# Patient Record
Sex: MALE | Race: WHITE | Employment: FULL TIME | URBAN - METROPOLITAN AREA
[De-identification: names, ages, dates, MRNs, and addresses within clinical notes are randomized per-mention and may not be internally consistent; named-entity substitution may affect disease eponyms.]

---

## 2020-03-16 ENCOUNTER — TELEPHONE (OUTPATIENT)
Dept: ENDOCRINOLOGY | Facility: CLINIC | Age: 55
End: 2020-03-16

## 2020-03-17 ENCOUNTER — ALLIED HEALTH/NURSE VISIT (OUTPATIENT)
Dept: SURGERY | Facility: CLINIC | Age: 55
End: 2020-03-17
Payer: OTHER GOVERNMENT

## 2020-03-17 ENCOUNTER — OFFICE VISIT (OUTPATIENT)
Dept: ENDOCRINOLOGY | Facility: CLINIC | Age: 55
End: 2020-03-17
Payer: OTHER GOVERNMENT

## 2020-03-17 VITALS
DIASTOLIC BLOOD PRESSURE: 89 MMHG | WEIGHT: 210.6 LBS | BODY MASS INDEX: 31.92 KG/M2 | SYSTOLIC BLOOD PRESSURE: 141 MMHG | HEART RATE: 66 BPM | HEIGHT: 68 IN | TEMPERATURE: 98.4 F | OXYGEN SATURATION: 96 %

## 2020-03-17 DIAGNOSIS — Z71.3 NUTRITIONAL COUNSELING: Primary | ICD-10-CM

## 2020-03-17 DIAGNOSIS — E66.09 CLASS 1 OBESITY DUE TO EXCESS CALORIES WITH SERIOUS COMORBIDITY AND BODY MASS INDEX (BMI) OF 32.0 TO 32.9 IN ADULT: ICD-10-CM

## 2020-03-17 DIAGNOSIS — E66.811 CLASS 1 OBESITY DUE TO EXCESS CALORIES WITH SERIOUS COMORBIDITY AND BODY MASS INDEX (BMI) OF 32.0 TO 32.9 IN ADULT: ICD-10-CM

## 2020-03-17 RX ORDER — AMLODIPINE BESYLATE 10 MG/1
TABLET ORAL
COMMUNITY
Start: 2019-11-22

## 2020-03-17 RX ORDER — TOPIRAMATE 25 MG/1
TABLET, FILM COATED ORAL
Qty: 270 TABLET | Refills: 1 | Status: SHIPPED | OUTPATIENT
Start: 2020-03-17 | End: 2020-04-16

## 2020-03-17 RX ORDER — PRAZOSIN HYDROCHLORIDE 1 MG/1
CAPSULE ORAL
COMMUNITY
Start: 2020-01-03

## 2020-03-17 ASSESSMENT — MIFFLIN-ST. JEOR: SCORE: 1779.78

## 2020-03-17 ASSESSMENT — PAIN SCALES - GENERAL: PAINLEVEL: NO PAIN (0)

## 2020-03-17 NOTE — PROGRESS NOTES
"    New Medical Weight Management Consult    PATIENT:  Reece Matos  MRN:         [id number]  :         1965  SOMMER:         3/17/2020    Dear primary care physician,    I had the pleasure of seeing your patient, Reece Matos. Full intake/assessment was done to determine barriers to weight loss success and develop a treatment plan. Reece Matos is a 54 year old male interested in treatment of medical problems associated with excess weight. He has a height of 5' 8\", a weight of 210 lbs 9.6 oz, and the calculated Body mass index is 32.02 kg/m .    ASSESSMENT/PLAN:  Reece is a patient with mature onset obesity without significant element of familial/genetic influence and with current health consequences. He does not need aggressive weight loss plan.  Reece Matos eats a high carb diet, eats a high fat diet, has perception of intense hunger, eats to obtain specific degree of fullness, mostly eats during the evening and has a disorganized meal pattern.    His problem is complicated by strong craving/reward pathways and poor lifestyle choices    His ability to lose weight is impacted by lack of confidence.    PLAN:    Eat breakfast daily  Decrease portion sizes  Decrease eating out  Purge house of food triggers  No meal skipping  Dietician visit of education  Calorie/low fat diet  Meal planning  Increase activity as tolerated    Craving/Reward   Ancillary testing:  N/A.  Food Plan:  High protein/low carbohydrate.   Activity Plan:  Exercise after meals.  Supplementary:  N/A.   Medication:  The patient will begin medication in pursuit of improved medical status as influenced by body weight. He will start topiramate titration from 25 mg daily up to 75 mg daily.  There is a mutual understanding of the goals and risks of this therapy. The patient is in agreement. He is educated on dosage regimen and possible side effects.      Orders Placed This Encounter   Procedures     MED THERAPY MANAGE REFERRAL " "      He has the following co-morbidities:hypertension, [place], knee pain, depression and fatigue       3/13/2020   I have the following health issues associated with obesity: High Blood Pressure, Sleep Apnea   I have the following symptoms associated with obesity: Knee Pain, Depression, Fatigue     He said that he gradually gained weight after he came back from [facility] in 2013. 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 He gradually gained up to 235 lbs in the past 8-10 years. He said that it is from dietary habit. He has not been eating well and has not exercised as he used to. He is now struggling with hypertension and [place] and would like to work on weight loss to reduce consequence of obesity.     He tried to dieting by fasting between 9 pm to 11 am and lost about 25 lbs. However, weight plateaued out. He reports eating most of his calories in the evening and larger portion. He feels strong urge and has food thought a lot in the evening. He does not eat much during the day. 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     Diet recalled:  BF: only coffee  Lunch: dry cereal or chip  Dinner: eat most calories at dinner + snacking on chip, ice-cream and sweet    Exercise: walking    Patient Goals 3/13/2020   I am interested in having a healthier weight to diminish current health problems: Yes   I am interested in having a healthier weight in order to prevent future health problems: Yes   I am interested in having a healthier weight in order to have a future surgery: No       Referring Provider 3/13/2020   Please name the provider who referred you to Medical Weight Management.  If you do not know, please answer: \"I Don't Know\". I don't know       Weight History 3/13/2020   How concerned are you about your weight? Very Concerned   Would you describe your weight gain as gradual? Yes   I became overweight: As an Adult   The following factors " have contributed to my weight gain:  Mental Health Issues, Eating Wrong Types of Food, Eating Too Much, Lack of Exercise, Stress, Other   Please list the other factors.  post deployment   I have tried the following methods to lose weight: Watching Portions or Calories, Exercise, Fasting   My lowest weight since age 20 was: 145   My highest weight since age 20 was: 235   The most weight I have ever lost was: (lbs) 20   I have the following family history of obesity/being overweight:  My mother is overweight, One or more of my siblings are overweight   Has anyone in your family had weight loss surgery? No   How has your weight changed over the last year?  Lost   How many pounds? 5       Diet Recall Review with Patient 3/13/2020   Do you typically eat breakfast? No   Do you typically eat lunch? No   Do you typically eat supper? Yes   If you do eat supper, what types of food do you typically eat? meats/starches   Do you typically eat snacks? Yes   If you do snack, what types of food do you typically eat? chips/dry cereal/grenola bars   Do you like vegetables?  No   Do you drink water? Yes   How many glasses of juice do you drink in a typical day? 0   How many of glasses of milk do you drink in a typical day? 0   If you do drink milk, what type? N/A   How many 8oz glasses of sugar containing drinks such as Ortiz-Aid/sweet tea do you drink in a day? 0   How many cans/bottles of sugar pop/soda/tea/sports drinks do you drink in a day? 0   How many cans/bottles of diet pop/soda/tea or sports drink do you drink in a day? 2   How often do you have a drink of alcohol? Monthly or Less   If you do drink, how many drinks might you have in a day? 1 or 2       Eating Habits 3/13/2020   Generally, my meals include foods like these: bread, pasta, rice, potatoes, corn, crackers, sweet dessert, pop, or juice. A Few Times a Week   Generally, my meals include foods like these: fried meats, brats, burgers, french fries, pizza, cheese,  chips, or ice cream. Once a Week   Eat fast food (like [employer]onal[employer], [employer], Taco Bell). Less Than Weekly   Eat at a buffet or sit-down restaurant. Less Than Weekly   Eat most of my meals in front of the TV or computer. Less Than Weekly   Often skip meals, eat at random times, have no regular eating times. Almost Everyday   Rarely sit down for a meal but snack or graze throughout.  Never   Eat extra snacks between meals. A Few Times a Week   Eat most of my food at the end of the day. Almost Everyday   Eat in the middle of the night or wake up at night to eat. Never   Eat extra snacks to prevent or correct low blood sugar. Never   Eat to prevent acid reflux or stomach pain. Never   Worry about not having enough food to eat. Never   Have you been to the food shelf at least a few times this year? No   I eat when I am depressed. A Few Times a Week   I eat when I am stressed. Less Than Weekly   I eat when I am bored. A Few Times a Week   I eat when I am anxious. Once a Week   I eat when I am happy or as a reward. Less Than Weekly   I feel hungry all the time even if I just have eaten. Never   Feeling full is important to me. Never   I finish all the food on my plate even if I am already full. A Few Times a Week   I can't resist eating delicious food or walk past the good food/smell. Less Than Weekly   I eat/snack without noticing that I am eating. A Few Times a Week   I eat when I am preparing the meal. Less Than Weekly   I eat more than usual when I see others eating. Never   I have trouble not eating sweets, ice cream, cookies, or chips if they are around the house. Less Than Weekly   I think about food all day. Less Than Weekly   What foods, if any, do you crave? None   Please list any other foods you crave? breads       Amount of Food 3/13/2020   I make myself vomit what I have eaten or use laxatives to get rid of food. Never   I eat a large amount of food, like a loaf of bread, a box of cookies, a pint/quart of  ice cream, all at once. Monthly   I eat a large amount of food even when I am not hungry. Weekly   I eat rapidly. Almost Everyday   I eat alone because I feel embarrassed and do not want others to see how much I have eaten. Weekly   I eat until I am uncomfortably full. Monthly   I feel bad, disgusted, or guilty after I overeat. Weekly   I make myself vomit what I have eaten or use laxatives to get rid of food. Never       Activity/Exercise History 3/13/2020   How much of a typical 12 hour day do you spend sitting? Most of the Day   How much of a typical 12 hour day do you spend lying down? Less Than Half the Day   How much of a typical day do you spend walking/standing? Less Than Half the Day   How many hours (not including work) do you spend on the TV/Video Games/Computer/Tablet/Phone? 2-3 Hours   How many times a week are you active for the purpose of exercise? 2-3 Times a Week   What keeps you from being more active? Too tired   How many total minutes do you spend doing some activity for the purpose of exercising when you exercise? More Than 30 Minutes       PAST MEDICAL HISTORY:  No past medical history on file.    Work/Social History Reviewed With Patient 3/13/2020   My employment status is: Full-Time   My job is:    How much of your job is spent on the computer or phone? 100%   How many hours do you spend commuting to work daily?  1   What is your marital status? /In a Relationship   If in a relationship, is your significant other overweight? Yes   Do you have children? Yes   If you have children, are they overweight? No   Who do you live with?  wife and 2 children   Are they supportive of your health goals? Yes   Who does the food shopping?  wife       Mental Health History Reviewed With Patient 3/13/2020   Have you ever been physically or sexually abused? No   If yes, do you feel that the abuse is affecting your weight? N/A   If yes, would you like to talk to a counselor about  "the abuse? N/A   How often in the past 2 weeks have you felt little interest or pleasure in doing things? For Several Days   Over the past 2 weeks how often have you felt down, depressed, or hopeless? For Several Days       Sleep History Reviewed With Patient 3/13/2020   How many hours do you sleep at night? 4.5   Do you think that you snore loudly or has anybody ever heard you snore loudly (louder than talking or so loud it can be heard behind a shut door)? Yes   Has anyone seen or heard you stop breathing during your sleep? Yes   Do you often feel tired, fatigued, or sleepy during the day? Yes   Do you have a TV/Computer in your bedroom? No       MEDICATIONS:   Current Outpatient Medications   Medication Sig Dispense Refill     amLODIPine (NORVASC) 10 MG tablet        FLUoxetine (PROZAC) 20 MG capsule        prazosin (MINIPRESS) 1 MG capsule          ALLERGIES:   Allergies no known allergies    PHYSICAL EXAM:  BP (!) 141/89 (BP Location: Left arm, Patient Position: Sitting, Cuff Size: Adult Large)   Pulse 66   Temp 98.4  F (36.9  C) (Oral)   Ht 1.727 m (5' 8\")   Wt 95.5 kg (210 lb 9.6 oz)   SpO2 96%   BMI 32.02 kg/m      Waist circumference: 109 cm    Wt Readings from Last 4 Encounters:   03/17/20 95.5 kg (210 lb 9.6 oz)     A & O x 3  HEENT: NCAT, mucous membranes moist  Respirations unlabored  Location of obesity: Mixed Obesity    FOLLOW-UP:   Follow up with Ross StricklandD in 4 weeks  See me in 8 weeks    TIME: 45 min spent on evaluation, management, counseling, education, & motivational interviewing with greater than 50 % of the total time was spent on counseling and coordinating care    Sincerely,    Cornelius Pacheco MD      "

## 2020-03-17 NOTE — LETTER
"3/17/2020       RE: Reece Matos  [id number] [id number]     Dear Colleague,    Thank you for referring your patient, Reece Matos, to the [facility] MEDICAL WEIGHT MANAGEMENT at [facility]. Please see a copy of my visit note below.        New Medical Weight Management Consult    PATIENT:  Reece Matos  MRN:         [id number]  :         1965  SOMMER:         3/17/2020    Dear primary care physician,    I had the pleasure of seeing your patient, Reece Matos. Full intake/assessment was done to determine barriers to weight loss success and develop a treatment plan. Reece Matos is a 54 year old male interested in treatment of medical problems associated with excess weight. He has a height of 5' 8\", a weight of 210 lbs 9.6 oz, and the calculated Body mass index is 32.02 kg/m .    ASSESSMENT/PLAN:  Reece is a patient with mature onset obesity without significant element of familial/genetic influence and with current health consequences. He does not need aggressive weight loss plan.  Reece Matos eats a high carb diet, eats a high fat diet, has perception of intense hunger, eats to obtain specific degree of fullness, mostly eats during the evening and has a disorganized meal pattern.    His problem is complicated by strong craving/reward pathways and poor lifestyle choices    His ability to lose weight is impacted by lack of confidence.    PLAN:    Eat breakfast daily  Decrease portion sizes  Decrease eating out  Purge house of food triggers  No meal skipping  Dietician visit of education  Calorie/low fat diet  Meal planning  Increase activity as tolerated    Craving/Reward   Ancillary testing:  N/A.  Food Plan:  High protein/low carbohydrate.   Activity Plan:  Exercise after meals.  Supplementary:  N/A.   Medication:  The patient will begin medication in pursuit of improved medical status as influenced by body weight. He will start " topiramate titration from 25 mg daily up to 75 mg daily.  There is a mutual understanding of the goals and risks of this therapy. The patient is in agreement. He is educated on dosage regimen and possible side effects.      Orders Placed This Encounter   Procedures     MED THERAPY MANAGE REFERRAL       He has the following co-morbidities:hypertension, [place], knee pain, depression and fatigue       3/13/2020   I have the following health issues associated with obesity: High Blood Pressure, Sleep Apnea   I have the following symptoms associated with obesity: Knee Pain, Depression, Fatigue     He said that he gradually gained weight after he came back from [facility] in 2013. 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 He gradually gained up to 235 lbs in the past 8-10 years. He said that it is from dietary habit. He has not been eating well and has not exercised as he used to. He is now struggling with hypertension and [place] and would like to work on weight loss to reduce consequence of obesity.     He tried to dieting by fasting between 9 pm to 11 am and lost about 25 lbs. However, weight plateaued out. He reports eating most of his calories in the evening and larger portion. He feels strong urge and has food thought a lot in the evening. He does not eat much during the day. 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     Diet recalled:  BF: only coffee  Lunch: dry cereal or chip  Dinner: eat most calories at dinner + snacking on chip, ice-cream and sweet    Exercise: walking    Patient Goals 3/13/2020   I am interested in having a healthier weight to diminish current health problems: Yes   I am interested in having a healthier weight in order to prevent future health problems: Yes   I am interested in having a healthier weight in order to have a future surgery: No       Referring Provider 3/13/2020   Please name the provider who referred you  "to Medical Weight Management.  If you do not know, please answer: \"I Don't Know\". I don't know       Weight History 3/13/2020   How concerned are you about your weight? Very Concerned   Would you describe your weight gain as gradual? Yes   I became overweight: As an Adult   The following factors have contributed to my weight gain:  Mental Health Issues, Eating Wrong Types of Food, Eating Too Much, Lack of Exercise, Stress, Other   Please list the other factors.  post deployment   I have tried the following methods to lose weight: Watching Portions or Calories, Exercise, Fasting   My lowest weight since age 20 was: 145   My highest weight since age 20 was: 235   The most weight I have ever lost was: (lbs) 20   I have the following family history of obesity/being overweight:  My mother is overweight, One or more of my siblings are overweight   Has anyone in your family had weight loss surgery? No   How has your weight changed over the last year?  Lost   How many pounds? 5       Diet Recall Review with Patient 3/13/2020   Do you typically eat breakfast? No   Do you typically eat lunch? No   Do you typically eat supper? Yes   If you do eat supper, what types of food do you typically eat? meats/starches   Do you typically eat snacks? Yes   If you do snack, what types of food do you typically eat? chips/dry cereal/grenola bars   Do you like vegetables?  No   Do you drink water? Yes   How many glasses of juice do you drink in a typical day? 0   How many of glasses of milk do you drink in a typical day? 0   If you do drink milk, what type? N/A   How many 8oz glasses of sugar containing drinks such as Ortiz-Aid/sweet tea do you drink in a day? 0   How many cans/bottles of sugar pop/soda/tea/sports drinks do you drink in a day? 0   How many cans/bottles of diet pop/soda/tea or sports drink do you drink in a day? 2   How often do you have a drink of alcohol? Monthly or Less   If you do drink, how many drinks might you have in a " day? 1 or 2       Eating Habits 3/13/2020   Generally, my meals include foods like these: bread, pasta, rice, potatoes, corn, crackers, sweet dessert, pop, or juice. A Few Times a Week   Generally, my meals include foods like these: fried meats, brats, burgers, french fries, pizza, cheese, chips, or ice cream. Once a Week   Eat fast food (like [employer], [employer], Taco Bell). Less Than Weekly   Eat at a buffet or sit-down restaurant. Less Than Weekly   Eat most of my meals in front of the TV or computer. Less Than Weekly   Often skip meals, eat at random times, have no regular eating times. Almost Everyday   Rarely sit down for a meal but snack or graze throughout.  Never   Eat extra snacks between meals. A Few Times a Week   Eat most of my food at the end of the day. Almost Everyday   Eat in the middle of the night or wake up at night to eat. Never   Eat extra snacks to prevent or correct low blood sugar. Never   Eat to prevent acid reflux or stomach pain. Never   Worry about not having enough food to eat. Never   Have you been to the food shelf at least a few times this year? No   I eat when I am depressed. A Few Times a Week   I eat when I am stressed. Less Than Weekly   I eat when I am bored. A Few Times a Week   I eat when I am anxious. Once a Week   I eat when I am happy or as a reward. Less Than Weekly   I feel hungry all the time even if I just have eaten. Never   Feeling full is important to me. Never   I finish all the food on my plate even if I am already full. A Few Times a Week   I can't resist eating delicious food or walk past the good food/smell. Less Than Weekly   I eat/snack without noticing that I am eating. A Few Times a Week   I eat when I am preparing the meal. Less Than Weekly   I eat more than usual when I see others eating. Never   I have trouble not eating sweets, ice cream, cookies, or chips if they are around the house. Less Than Weekly   I think about food all day. Less Than Weekly    What foods, if any, do you crave? None   Please list any other foods you crave? breads       Amount of Food 3/13/2020   I make myself vomit what I have eaten or use laxatives to get rid of food. Never   I eat a large amount of food, like a loaf of bread, a box of cookies, a pint/quart of ice cream, all at once. Monthly   I eat a large amount of food even when I am not hungry. Weekly   I eat rapidly. Almost Everyday   I eat alone because I feel embarrassed and do not want others to see how much I have eaten. Weekly   I eat until I am uncomfortably full. Monthly   I feel bad, disgusted, or guilty after I overeat. Weekly   I make myself vomit what I have eaten or use laxatives to get rid of food. Never       Activity/Exercise History 3/13/2020   How much of a typical 12 hour day do you spend sitting? Most of the Day   How much of a typical 12 hour day do you spend lying down? Less Than Half the Day   How much of a typical day do you spend walking/standing? Less Than Half the Day   How many hours (not including work) do you spend on the TV/Video Games/Computer/Tablet/Phone? 2-3 Hours   How many times a week are you active for the purpose of exercise? 2-3 Times a Week   What keeps you from being more active? Too tired   How many total minutes do you spend doing some activity for the purpose of exercising when you exercise? More Than 30 Minutes       PAST MEDICAL HISTORY:  No past medical history on file.    Work/Social History Reviewed With Patient 3/13/2020   My employment status is: Full-Time   My job is:    How much of your job is spent on the computer or phone? 100%   How many hours do you spend commuting to work daily?  1   What is your marital status? /In a Relationship   If in a relationship, is your significant other overweight? Yes   Do you have children? Yes   If you have children, are they overweight? No   Who do you live with?  wife and 2 children   Are they supportive of  "your health goals? Yes   Who does the food shopping?  wife       Mental Health History Reviewed With Patient 3/13/2020   Have you ever been physically or sexually abused? No   If yes, do you feel that the abuse is affecting your weight? N/A   If yes, would you like to talk to a counselor about the abuse? N/A   How often in the past 2 weeks have you felt little interest or pleasure in doing things? For Several Days   Over the past 2 weeks how often have you felt down, depressed, or hopeless? For Several Days       Sleep History Reviewed With Patient 3/13/2020   How many hours do you sleep at night? 4.5   Do you think that you snore loudly or has anybody ever heard you snore loudly (louder than talking or so loud it can be heard behind a shut door)? Yes   Has anyone seen or heard you stop breathing during your sleep? Yes   Do you often feel tired, fatigued, or sleepy during the day? Yes   Do you have a TV/Computer in your bedroom? No       MEDICATIONS:   Current Outpatient Medications   Medication Sig Dispense Refill     amLODIPine (NORVASC) 10 MG tablet        FLUoxetine (PROZAC) 20 MG capsule        prazosin (MINIPRESS) 1 MG capsule          ALLERGIES:   Allergies no known allergies    PHYSICAL EXAM:  BP (!) 141/89 (BP Location: Left arm, Patient Position: Sitting, Cuff Size: Adult Large)   Pulse 66   Temp 98.4  F (36.9  C) (Oral)   Ht 1.727 m (5' 8\")   Wt 95.5 kg (210 lb 9.6 oz)   SpO2 96%   BMI 32.02 kg/m      Waist circumference: 109 cm    Wt Readings from Last 4 Encounters:   03/17/20 95.5 kg (210 lb 9.6 oz)     A & O x 3  HEENT: NCAT, mucous membranes moist  Respirations unlabored  Location of obesity: Mixed Obesity    FOLLOW-UP:   Follow up with Nanda Aviles, RossD in 4 weeks  See me in 8 weeks    TIME: 45 min spent on evaluation, management, counseling, education, & motivational interviewing with greater than 50 % of the total time was spent on counseling and coordinating care    Sincerely,    Cornelius " MD Tara

## 2020-03-17 NOTE — NURSING NOTE
"(   Chief Complaint   Patient presents with     Consult     New appointment weight management.     )    ( Weight: 95.5 kg (210 lb 9.6 oz) )  ( Height: 172.7 cm (5' 8\") )  ( BMI (Calculated): 32.02 )  (   )  ( Cumulative weight loss (lbs): -0.6 )  (   )  (   )  ( Waist Circumference (cm): 109 cm )  (   )    ( BP: (!) 141/89 )  (   )  ( Temp: 98.4  F (36.9  C) )  ( Temp src: Oral )  ( Pulse: 66 )  (   )  ( SpO2: 96 % )    ( There is no problem list on file for this patient.   )  (   Current Outpatient Medications   Medication Sig Dispense Refill     amLODIPine (NORVASC) 10 MG tablet        FLUoxetine (PROZAC) 20 MG capsule        prazosin (MINIPRESS) 1 MG capsule       )  ( Diabetes Eval:    )    ( Pain Eval:  No Pain (0) )    ( Wound Eval:       )    (   History   Smoking Status     Never Smoker   Smokeless Tobacco     Never Used    )    ( Signed By:  Beverly Cantrell EMT; March 17, 2020; 10:43 AM )    "

## 2020-03-17 NOTE — PATIENT INSTRUCTIONS
"Please take a look at this website for resources and recipes [email or web address].[email or web address]    Try to stop eating before 8 pm    Eat protein or drink protein shake at 9 am    Start topiramate: Take 25 mg (one tab) at bedtime for one week, then increase to 50 mg (two tabs) at bedtime, then increase to 75 mg (3 tabs) thereafter.     Return 2 months    If you have any questions, please do not hesitate to call Weight management clinic at [phone] or [phone]    Sincerely,    Cornelius Pacheco MD  Endocrinology     Welcome to our weight management program!   We are excited to join you on your weight loss journey    Thank you for allowing us the privilege of caring for you. We hope we provided you with the excellent service you deserve.   Please let us know if there is anything else we can do for you so that we can be sure you are leaving completely satisfied with your care experience.    To ensure the quality of our services you may be receiving a patient satisfaction survey from an independent patient satisfaction monitoring company.    The greatest compliment you can give is a \"Likely to Recommend\"    You saw Dr. Cornelius SZYMANSKI today.    Instructions per today's visit:   Medications started today: Topiramate ramp to 75mg  MTM pharmacist referral: 1 month by phone    Follow up appointments:  Dietitian today and monthly as needed.  Lauren Bloch, MTM phrmacist by phone in 1 month.  Dr. Shields in 2 months.    Interested in working with a health ?  Health coaches work with you to improve your overall health and wellbeing.  They look at the whole person, and may involve discussion of different areas of life, including, but not limited to the four pillars of health (sleep, exercise, nutrition, and stress management). Discuss with your care team if you would like to start working a health .  Health Coaching-3 Pack:    $99 for three health coaching visits    Visits may be done in person or via phone    Coaching " is a partnership between the  and the client; Coaches do not prescribe or diagnose    Coaching helps inspire the client to reach his/her personal goals     For any questions/concerns contact Jacqueline Sanchez LPN at [phone]     To schedule appointments with our team, please call [phone]     Please call during clinic hours Monday through Friday 8:00a - 4:00p if you have questions or you can contact us via [email or web address] at anytime. ?    Lab results will be communicated through My Chart or letter (if My Chart not used). Please call the clinic if you have not received communication after 1 week or if you have any questions.?      Fax: [phone]    Thank you,  Medical Weight Management Team

## 2020-03-17 NOTE — PATIENT INSTRUCTIONS
"Nutrition Goals  1) 9\" Plate method (1/2 plate non-starchy vegetables/fruit, 1/4 plate lean protein, 1/4 plate whole grain starch - no more than 1/2 cup carb/meal)   - Try packing a lunch for work that meets plate method criteria   - Can also try a protein shake or bar as a meal replacement (see lists below)  2) Eat slowly (20-30 minutes per meal) and mindfully. Try not to eat until you're full, but only until you've satisfied your hunger.    - Set utensil down between bites of food   - Set a timer on your phone for 20-30 minutes   - Eat with non-dominant hand   - Sit up straight rather than a slouched posture   - Try to avoid eating in front of a screen (TV, computer, phone)   - Leave a few bites of food on your plate (rather than \"clean plate club\")    *Protein Shake Criteria: no more than 210 Calories, at least 20 grams of protein, and less than 10 grams of sugar     Meal Replacement Shake Options:   [facility] smoothie (160 Calories, 20 g protein)   Premier Protein (160 Calories, 30 g protein)  Slim Fast Advanced Nutrition (180 Calories, 20 g protein)  Muscle Milk, lactose-free, 17 oz bottle (210 Calories, 30 g protein)  Integrated Supplements, no artificial sugars (110 Calories, 20 g protein)  Genepro, unflavored protein powder (60 Calories, 30 g protein)    Meal Replacement Bar Options:  [facility] Protein Shake (160 Calories, 15 g protein)  Quest Protein Bars (190 Calories, 20 g protein)  Built Bar (170 Calories, 15-20 g protein)  One Protein Bar (210 calories, 20 g protein)  [place] Signature Protein Bar ([employer]) (190 Calories, 21 g protein)  Pure Protein Bars (180 Calories, 21 g protein)    Follow up with RD in one month    Alayna Jennings, MS, RD, LD  If you need to schedule or reschedule with a dietitian please call [phone].      Interested in working with a health ?  Health coaches work with you to improve your overall health and wellbeing.  They look at the whole person, and may involve " discussion of different areas of life, including, but not limited to the four pillars of health (sleep, exercise, nutrition, and stress management). Discuss with your care team if you would like to start working a health .     Health Coaching-3 Pack:      $99 for three health coaching visits    Visits may be done in person or via phone    Coaching is a partnership between the  and the client; Coaches do not prescribe or diagnose    Coaching helps inspire the client to reach his/her personal goals

## 2020-03-17 NOTE — PROGRESS NOTES
"New Weight Management Nutrition Consultation    Reece Matos is a 54 year old male presents today for new weight management nutrition consultation.  Patient referred by Dr. Shields on March 17, 2020.    Patient with Co-morbidities of obesity including:  Type II DM no  Renal Failure no  Sleep apnea no  Hypertension no - no mention in chart, but on 2 medications for high BP  Dyslipidemia no  Joint pain no  Back pain no  GERD no     Anthropometrics:  Estimated body mass index is 32.02 kg/m  as calculated from the following:    Height as of an earlier encounter on 3/17/20: 1.727 m (5' 8\").    Weight as of an earlier encounter on 3/17/20: 95.5 kg (210 lb 9.6 oz).    Medications for Weight Loss:  Topamax    NUTRITION HISTORY  See MD note for details.    Cravings include bread.  Large portions at night and snacking in afternoon/evening.  Only 4.5 hours of sleep at night per survey.  Lactose intolerant.    Recent food recall:  Breakfast: skip  Lunch: dry cereal or baked chips  Dinner: meat and starches (mac and cheese, green beans, chx nuggets)  Snacks: chips, dry cereal, granola bars, chips and cheese, ice cream, girl  cookies  Beverages: soda, coffee, water  Alcohol: monthly or less; 1-2 drinks per occasion (beer)  Dining out: less than weekly (once every couple weeks)    Physical Activity:  None at this time. 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 Currently sits at a desk most of the day for work. Has an elliptical and free weights at home and plans to start using these more.    MALNUTRITION  % Intake: No decreased intake noted  % Weight Loss: None noted  Subcutaneous Fat Loss: None observed  Muscle Loss: None observed  Fluid Accumulation/Edema: None noted  Malnutrition Diagnosis: Patient does not meet two of the established criteria necessary for diagnosing malnutrition    Nutrition Prescription  Recommended " "energy/nutrient modification.    Nutrition Diagnosis  Obesity r/t long history of self-monitoring deficit and excessive energy intake aeb BMI >30.    Nutrition Intervention  Materials/education provided on Volumetric eating to help satiety level on fewer calories; portion control and healthy food choices (Plate Method handout), sources of protein, and mindful eating discussed. Encouraged pt to move many of his calories to earlier in the day to avoid eating large portions and snacking at night. Discussed options for breakfast and lunch (protein shake, leftovers, options in the cafeteria). Also encouraged him to slow down when eating. Provided patient with list of goals and RD contact information.     Patient Understanding: good  Expected Compliance: good  Follow-Up Plans: smaller portions, slowing down when eating, avoid skipping meals     Nutrition Goals  1) 9\" Plate method (1/2 plate non-starchy vegetables/fruit, 1/4 plate lean protein, 1/4 plate whole grain starch - no more than 1/2 cup carb/meal)   - Try packing a lunch for work that meets plate method criteria   - Can also try a protein shake or bar as a meal replacement (see lists below)  2) Eat slowly (20-30 minutes per meal) and mindfully. Try not to eat until you're full, but only until you've satisfied your hunger.    - Set utensil down between bites of food   - Set a timer on your phone for 20-30 minutes   - Eat with non-dominant hand   - Sit up straight rather than a slouched posture   - Try to avoid eating in front of a screen (TV, computer, phone)   - Leave a few bites of food on your plate (rather than \"clean plate club\")    *Protein Shake Criteria: no more than 210 Calories, at least 20 grams of protein, and less than 10 grams of sugar     Meal Replacement Shake Options:    [employer] [facility] smoothie (160 Calories, 20 g protein)   Premier Protein (160 Calories, 30 g protein)  Slim Fast Advanced Nutrition (180 Calories, 20 g protein)  Muscle Milk, " lactose-free, 17 oz bottle (210 Calories, 30 g protein)  Integrated Supplements, no artificial sugars (110 Calories, 20 g protein)  Genepro, unflavored protein powder (60 Calories, 30 g protein)    Meal Replacement Bar Options:  [facility] Protein Shake (160 Calories, 15 g protein)  Quest Protein Bars (190 Calories, 20 g protein)  Built Bar (170 Calories, 15-20 g protein)  One Protein Bar (210 calories, 20 g protein)  [place] Signature Protein Bar ([employer]) (190 Calories, 21 g protein)  Pure Protein Bars (180 Calories, 21 g protein)    Follow-Up:  1 month    Time spent with patient: 30 minutes.  Alayna Jennings, MS, RD, LD

## 2020-04-16 ENCOUNTER — ALLIED HEALTH/NURSE VISIT (OUTPATIENT)
Dept: PHARMACY | Facility: CLINIC | Age: 55
End: 2020-04-16
Payer: OTHER GOVERNMENT

## 2020-04-16 PROCEDURE — 99207 ZZC NO CHARGE LOS: CPT | Performed by: PHARMACIST

## 2020-04-16 RX ORDER — TOPIRAMATE 25 MG/1
100 TABLET, FILM COATED ORAL DAILY
Qty: 120 TABLET | Refills: 1 | Status: SHIPPED | OUTPATIENT
Start: 2020-04-16 | End: 2020-09-01

## 2020-04-16 NOTE — PROGRESS NOTES
"Clinical Pharmacy Consult:                                                    Reece Matos is a 54 year old male called for a clinical pharmacist consult.  He was referred to me from Dr. Shields.     Reason for Consult: Topiramate check in     Obesity:   Topiramate 75 mg once daily at bedtime.     Followed by Dr. Cornelius Pacheco, last seen 3/17/2020 for New Medical Weight Management. Patient is experiencing the follow side effects: paraesthesia intermittently. No issues with sleep. No GI effects, intermittent bloating at the beginning but not now.   PMH:   Weight History: Reports that weight gain was gradual after he came back from [facility] in 2013. 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   Diet/Eating Habits: Patient reports that he loves carbs, breads.  Usually throughout the day because he is busy he doesn't typically have issues of cravings during the day. When he gets home is the issue. Has been eating more vegetables. Tries to stay away from corn. Eating more salad. Using a smaller plate.   Exercise/Activity: Patient reports that weather permitting he does walk outside 1 mile walk with dog a couple times per week. He is weight lifting 3 times weekly, 30 minutes each time. Busy at work with walking.      Wt Readings from Last 4 Encounters:   03/17/20 210 lb 9.6 oz (95.5 kg)     Estimated body mass index is 32.02 kg/m  as calculated from the following:    Height as of 3/17/20: 5' 8\" (1.727 m).    Weight as of 3/17/20: 210 lb 9.6 oz (95.5 kg).    Plan:  1. Can increase to topiramate 100 mg in evening when get home. CPA with Dr. Shields.  2. Follow up in 1 month.     Lauren Bloch, PharmD  Medication Therapy Management Pharmacist   [facility]   Phone: [phone]          "

## 2020-05-12 ENCOUNTER — ALLIED HEALTH/NURSE VISIT (OUTPATIENT)
Dept: PHARMACY | Facility: CLINIC | Age: 55
End: 2020-05-12
Payer: OTHER GOVERNMENT

## 2020-05-12 PROCEDURE — 99207 ZZC NO CHARGE LOS: CPT | Performed by: PHARMACIST

## 2020-05-12 NOTE — PROGRESS NOTES
"Clinical Pharmacy Consult:                                                    Reece Matos is a 54 year old male called for a clinical pharmacist consult.  He was referred to me from Dr. Shields.     Reason for Consult: Weight loss check in    Obesity:   Topiramate 100 mg once daily in evening.     Followed by Dr. Cornelius Pacheco, last seen 3/17/2020 for New Medical Weight Management. Patient is experiencing the follow side effects: none. Paraesthesia intermittent, not bothersome. Has lost 3 lb over the last month, cravings are tapering down. He is happy he is starting to see weight loss. He is not wanting to make additions/changes to regimen as of now.   Weight History: Reports that weight gain was gradual after he came back from [facility] in 2013. 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   Diet/Eating Habits: Has been using [employer] pal and tied into fit bit. Finds that appetite is lower. He is paying attention to carbs, fats and proteins in trish - using as an experiment and correcting when needed. Has been eating more vegetables. Using a smaller plate.   Exercise/Activity: Patient reports that weather permitting he does walk outside 1 mile walk with dog a couple times per week. He is weight lifting 3 times weekly, 30 minutes each time. Busy at work with walking.       Weight on home scale: 207 lb.  Wt Readings from Last 4 Encounters:   03/17/20 210 lb 9.6 oz (95.5 kg)     Estimated body mass index is 32.02 kg/m  as calculated from the following:    Height as of 3/17/20: 5' 8\" (1.727 m).    Weight as of 3/17/20: 210 lb 9.6 oz (95.5 kg).    Plan:  1. Continue current regimen for now.   2. Follow up with pharmacist in 1 month     Lauren Bloch, [place]  Medication Therapy Management Pharmacist   [facility]   Phone: [phone]      "

## 2020-05-12 NOTE — Clinical Note
FYI, he is starting to see some weight loss with increased topiramate dose. He didn't want to make further adjustments to meds today. He has made good progress with nutrition and exercise. He will f/up with me in 1 month and you in 2 months. Nanda EAST

## 2020-09-01 RX ORDER — TOPIRAMATE 25 MG/1
75 TABLET, FILM COATED ORAL DAILY
Qty: 270 TABLET | Refills: 0 | Status: SHIPPED | OUTPATIENT
Start: 2020-09-01 | End: 2020-09-08 | Stop reason: DRUGHIGH

## 2020-09-01 NOTE — TELEPHONE ENCOUNTER
TOPIRAMATE 25 MG TABLET   Last Written Prescription Date:  4/16/2020  Last Fill Quantity: 120,   # refills: 1  Last Office Visit : 3/17/2020  Future Office visit:  None    Routing refill request to provider for review/approval because:  Needing updated Labs on file:  CBC, ALT, AST, PLT       Also, continue same dose?   Change dose?   Follow up with Pt?      Refer to Provider for review.    Marie Loredo RN  Central Triage Red Flags/Med Refills

## 2020-09-02 ENCOUNTER — DOCUMENTATION ONLY (OUTPATIENT)
Dept: CARE COORDINATION | Facility: CLINIC | Age: 55
End: 2020-09-02

## 2020-09-08 ENCOUNTER — VIRTUAL VISIT (OUTPATIENT)
Dept: ENDOCRINOLOGY | Facility: CLINIC | Age: 55
End: 2020-09-08
Payer: OTHER GOVERNMENT

## 2020-09-08 VITALS — WEIGHT: 200 LBS | HEIGHT: 68 IN | BODY MASS INDEX: 30.31 KG/M2

## 2020-09-08 RX ORDER — TOPIRAMATE 50 MG/1
TABLET, FILM COATED ORAL
Qty: 180 TABLET | Refills: 2 | Status: SHIPPED | OUTPATIENT
Start: 2020-09-08

## 2020-09-08 ASSESSMENT — MIFFLIN-ST. JEOR: SCORE: 1726.69

## 2020-09-08 ASSESSMENT — PAIN SCALES - GENERAL: PAINLEVEL: NO PAIN (0)

## 2020-09-08 NOTE — PATIENT INSTRUCTIONS
- restart topiramate 50 mg daily for 2 weeks then increase to 100 mg daily thereafter    - return in 2 months    If you have any questions, please do not hesitate to call Weight management clinic at [phone] or [phone]    Sincerely,    Cornelius Pacheco MD  Endocrinology

## 2020-09-08 NOTE — PROGRESS NOTES
"Reece Matos is a 55 year old male who is being evaluated via a billable video visit.      The patient has been notified of following:     \"This video visit will be conducted via a call between you and your physician/provider. We have found that certain health care needs can be provided without the need for an in-person physical exam.  This service lets us provide the care you need with a video conversation.  If a prescription is necessary we can send it directly to your pharmacy.  If lab work is needed we can place an order for that and you can then stop by our lab to have the test done at a later time.    Video visits are billed at different rates depending on your insurance coverage.  Please reach out to your insurance provider with any questions.    If during the course of the call the physician/provider feels a video visit is not appropriate, you will not be charged for this service.\"    Patient has given verbal consent for Video visit? Yes  How would you like to obtain your AVS? MyChart  If you are dropped from the video visit, the video invite should be resent to: Text to cell phone: [phone]  Will anyone else be joining your video visit? No      During this virtual visit the patient is located in [place], patient verifies this as the location during the entirety of this visit.   Video-Visit Details    Type of service:  Video Visit    Video Start Time: 2:50 PM  Video End Time: 3:05 PM    Originating Location (pt. Location): Home    Distant Location (provider location):  [email or web address]     Platform used for Video Visit: ShipEarlyEcoSwarm        [facility] Medical Weight Management Note     Reece Matos  MRN:  [id number]  :  1965  SOMMER:  2020    Dear Benjamin E Van Vranken, MD,    I had the pleasure of seeing your patient Reece Matos. He is a 55 year old male who I am continuing to see for treatment of obesity related to: hypertension, [place], knee pain, depression and fatigue       " "3/13/2020   I have the following health issues associated with obesity: High Blood Pressure, Sleep Apnea   I have the following symptoms associated with obesity: Knee Pain, Depression, Fatigue     He said that he gradually gained weight after he came back from [facility] in 2013. 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 He gradually gained up to 235 lbs in the past 8-10 years. He said that it is from dietary habit. He has not been eating well and has not exercised as he used to.    INTERVAL HISTORY:  Last seen 3/17/2020 with me and with Lauren Bloch on 5/12/2020.    Started on topiramate March 2020 and increase topiramate to 100 mg daily since May. He said that it helped with cutting down soda and appetite. However, he feels that the medication did not work well since July 2020 so he stopped taking it after he ran out.  He lost 10 lbs in 6 months. He feels that he struggles with increased appetite after stopping topiramate.    He has continued to work on health lifestyle. He runs about 3 miles x 4-5 times per week. He is motivated for weight loss.    CURRENT WEIGHT:   200 lbs 0 oz    Initial Weight (lbs): 210 lbs  Last Visits Weight: 95.5 kg (210 lb 9.6 oz)  Cumulative weight loss (lbs): 10  Weight Loss Percentage: 4.76%    Changes and Difficulties 9/7/2020   I have made the following changes to my diet since my last visit: Eating smaller portions;healthier food   With regards to my diet, I am still struggling with: Carbs   I have made the following changes to my activity/exercise since my last visit: Running 4-5xs/week, 3.5 miles average each run       VITALS:  Ht 1.727 m (5' 8\")   Wt 90.7 kg (200 lb)   BMI 30.41 kg/m      MEDICATIONS:   Current Outpatient Medications   Medication Sig Dispense Refill     amLODIPine (NORVASC) 10 MG tablet        FLUoxetine (PROZAC) 20 MG capsule        prazosin (MINIPRESS) 1 MG capsule        topiramate (TOPAMAX) 25 MG tablet " Take 3 tablets (75 mg) by mouth daily 270 tablet 0       Weight Loss Medication History Reviewed With Patient 9/7/2020   Which weight loss medications are you currently taking on a regular basis?  Topamax (topiramate)   If you are not taking a weight loss medication that was prescribed to you, please indicate why: It did not seem to be helping me   Are you having any side effects from the weight loss medication that we have prescribed you? No       ASSESSMENT:   Reece Matos is a 55 year old male who I am continuing to see for treatment of obesity related to: hypertension, [place], knee pain, depression and fatigue    Weight is down 10 lbs in 6 months  topiramate helped cutting down appetite  Stopped after he ran out about 2 months ago  Appetite picked up slightly  Continues to work on diet and exercise regularly    PLAN:   - restart topriamate 50 mg daily for 2 weeks then increase to 100 mg daily  - continue to work on diet and exercise regularly    FOLLOW-UP:    2 months follow up.    Time: 15 min spent on evaluation, management, counseling, education, & motivational interviewing with greater than 50 % of the total time was spent on counseling and coordinating care    Sincerely,    Cornelius Pacheco MD

## 2020-09-08 NOTE — NURSING NOTE
"Chief Complaint   Patient presents with     RECHECK     Follow up appointment.       Vitals:    09/08/20 1418   Weight: 90.7 kg (200 lb)   Height: 1.727 m (5' 8\")       Body mass index is 30.41 kg/m .                            IOANA SALGADO, EMT    "

## 2020-09-08 NOTE — LETTER
"9/8/2020       RE: Reece Matos  [id number] [id number]     Dear Colleague,    Thank you for referring your patient, Reece Matos, to the [facility] at [facility]. Please see a copy of my visit note below.    Reece Matos is a 55 year old male who is being evaluated via a billable video visit.      The patient has been notified of following:     \"This video visit will be conducted via a call between you and your physician/provider. We have found that certain health care needs can be provided without the need for an in-person physical exam.  This service lets us provide the care you need with a video conversation.  If a prescription is necessary we can send it directly to your pharmacy.  If lab work is needed we can place an order for that and you can then stop by our lab to have the test done at a later time.    Video visits are billed at different rates depending on your insurance coverage.  Please reach out to your insurance provider with any questions.    If during the course of the call the physician/provider feels a video visit is not appropriate, you will not be charged for this service.\"    Patient has given verbal consent for Video visit? Yes  How would you like to obtain your AVS? MyChart  If you are dropped from the video visit, the video invite should be resent to: Text to cell phone: [phone]  Will anyone else be joining your video visit? No      During this virtual visit the patient is located in [place], patient verifies this as the location during the entirety of this visit.   Video-Visit Details    Type of service:  Video Visit    Video Start Time: 2:50 PM  Video End Time: 3:05 PM    Originating Location (pt. Location): Home    Distant Location (provider location):   [place] [facility]     Platform used for Video Visit: Doximity        Return Medical Weight Management Note     Reece Matos  MRN:  " "[id number]  :  1965  SOMMER:  2020    Dear Benjamin E Van Vranken, MD,    I had the pleasure of seeing your patient Reece Matos. He is a 55 year old male who I am continuing to see for treatment of obesity related to: hypertension, [place], knee pain, depression and fatigue       3/13/2020   I have the following health issues associated with obesity: High Blood Pressure, Sleep Apnea   I have the following symptoms associated with obesity: Knee Pain, Depression, Fatigue     He said that he gradually gained weight after he came back from [facility] in . 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 He gradually gained up to 235 lbs in the past 8-10 years. He said that it is from dietary habit. He has not been eating well and has not exercised as he used to.    INTERVAL HISTORY:  Last seen 3/17/2020 with me and with Lauren Bloch on 2020.    Started on topiramate 2020 and increase topiramate to 100 mg daily since May. He said that it helped with cutting down soda and appetite. However, he feels that the medication did not work well since 2020 so he stopped taking it after he ran out.  He lost 10 lbs in 6 months. He feels that he struggles with increased appetite after stopping topiramate.    He has continued to work on health lifestyle. He runs about 3 miles x 4-5 times per week. He is motivated for weight loss.    CURRENT WEIGHT:   200 lbs 0 oz    Initial Weight (lbs): 210 lbs  Last Visits Weight: 95.5 kg (210 lb 9.6 oz)  Cumulative weight loss (lbs): 10  Weight Loss Percentage: 4.76%    Changes and Difficulties 2020   I have made the following changes to my diet since my last visit: Eating smaller portions;healthier food   With regards to my diet, I am still struggling with: Carbs   I have made the following changes to my activity/exercise since my last visit: Running 4-5xs/week, 3.5 miles average each run       VITALS:  Ht 1.727 m (5' 8\") "   Wt 90.7 kg (200 lb)   BMI 30.41 kg/m      MEDICATIONS:   Current Outpatient Medications   Medication Sig Dispense Refill     amLODIPine (NORVASC) 10 MG tablet        FLUoxetine (PROZAC) 20 MG capsule        prazosin (MINIPRESS) 1 MG capsule        topiramate (TOPAMAX) 25 MG tablet Take 3 tablets (75 mg) by mouth daily 270 tablet 0       Weight Loss Medication History Reviewed With Patient 9/7/2020   Which weight loss medications are you currently taking on a regular basis?  Topamax (topiramate)   If you are not taking a weight loss medication that was prescribed to you, please indicate why: It did not seem to be helping me   Are you having any side effects from the weight loss medication that we have prescribed you? No       ASSESSMENT:   Reece Matos is a 55 year old male who I am continuing to see for treatment of obesity related to: hypertension, [place], knee pain, depression and fatigue    Weight is down 10 lbs in 6 months  topiramate helped cutting down appetite  Stopped after he ran out about 2 months ago  Appetite picked up slightly  Continues to work on diet and exercise regularly    PLAN:   - restart topriamate 50 mg daily for 2 weeks then increase to 100 mg daily  - continue to work on diet and exercise regularly    FOLLOW-UP:    2 months follow up.    Time: 15 min spent on evaluation, management, counseling, education, & motivational interviewing with greater than 50 % of the total time was spent on counseling and coordinating care    Sincerely,    Cornelius Pacheco MD

## 2021-01-04 ENCOUNTER — HEALTH MAINTENANCE LETTER (OUTPATIENT)
Age: 56
End: 2021-01-04

## 2021-10-10 ENCOUNTER — HEALTH MAINTENANCE LETTER (OUTPATIENT)
Age: 56
End: 2021-10-10

## 2022-01-30 ENCOUNTER — HEALTH MAINTENANCE LETTER (OUTPATIENT)
Age: 57
End: 2022-01-30

## 2022-09-24 ENCOUNTER — HEALTH MAINTENANCE LETTER (OUTPATIENT)
Age: 57
End: 2022-09-24

## 2023-05-08 ENCOUNTER — HEALTH MAINTENANCE LETTER (OUTPATIENT)
Age: 58
End: 2023-05-08